# Patient Record
Sex: FEMALE | Race: WHITE
[De-identification: names, ages, dates, MRNs, and addresses within clinical notes are randomized per-mention and may not be internally consistent; named-entity substitution may affect disease eponyms.]

---

## 2019-03-03 ENCOUNTER — HOSPITAL ENCOUNTER (EMERGENCY)
Dept: HOSPITAL 96 - M.ERS | Age: 59
LOS: 1 days | Discharge: HOME | End: 2019-03-04
Payer: COMMERCIAL

## 2019-03-03 VITALS — HEIGHT: 62 IN | WEIGHT: 210.01 LBS | BODY MASS INDEX: 38.65 KG/M2

## 2019-03-03 DIAGNOSIS — I10: ICD-10-CM

## 2019-03-03 DIAGNOSIS — R55: Primary | ICD-10-CM

## 2019-03-03 DIAGNOSIS — E78.00: ICD-10-CM

## 2019-03-03 LAB
ABSOLUTE BASOPHILS: 0.1 THOU/UL (ref 0–0.2)
ABSOLUTE EOSINOPHILS: 0.2 THOU/UL (ref 0–0.7)
ABSOLUTE MONOCYTES: 0.6 THOU/UL (ref 0–1.2)
ALBUMIN SERPL-MCNC: 3.6 G/DL (ref 3.4–5)
ALP SERPL-CCNC: 64 U/L (ref 46–116)
ALT SERPL-CCNC: 18 U/L (ref 30–65)
ANION GAP SERPL CALC-SCNC: 6 MMOL/L (ref 7–16)
AST SERPL-CCNC: 16 U/L (ref 15–37)
BASOPHILS NFR BLD AUTO: 1.2 %
BILIRUB SERPL-MCNC: 0.5 MG/DL
BILIRUB UR-MCNC: NEGATIVE MG/DL
BUN SERPL-MCNC: 13 MG/DL (ref 7–18)
CALCIUM SERPL-MCNC: 8.6 MG/DL (ref 8.5–10.1)
CHLORIDE SERPL-SCNC: 102 MMOL/L (ref 98–107)
CO2 SERPL-SCNC: 31 MMOL/L (ref 21–32)
COLOR UR: YELLOW
CREAT SERPL-MCNC: 1.2 MG/DL (ref 0.6–1.3)
EOSINOPHIL NFR BLD: 2.3 %
GLUCOSE SERPL-MCNC: 143 MG/DL (ref 70–99)
GRANULOCYTES NFR BLD MANUAL: 68.7 %
HCT VFR BLD CALC: 35.6 % (ref 37–47)
HGB BLD-MCNC: 11.7 GM/DL (ref 12–15)
INR PPP: 1
KETONES UR STRIP-MCNC: NEGATIVE MG/DL
LIPASE: 117 U/L (ref 73–393)
LYMPHOCYTES # BLD: 1.3 THOU/UL (ref 0.8–5.3)
LYMPHOCYTES NFR BLD AUTO: 19.1 %
MCH RBC QN AUTO: 26.5 PG (ref 26–34)
MCHC RBC AUTO-ENTMCNC: 33 G/DL (ref 28–37)
MCV RBC: 80.4 FL (ref 80–100)
MONOCYTES NFR BLD: 8.7 %
MPV: 7.7 FL. (ref 7.2–11.1)
NEUTROPHILS # BLD: 4.6 THOU/UL (ref 1.6–8.1)
NT-PRO BRAIN NAT PEPTIDE: 52 PG/ML (ref ?–300)
NUCLEATED RBCS: 0 /100WBC
PLATELET COUNT*: 265 THOU/UL (ref 150–400)
POTASSIUM SERPL-SCNC: 3.4 MMOL/L (ref 3.5–5.1)
PROT SERPL-MCNC: 7.3 G/DL (ref 6.4–8.2)
PROT UR QL STRIP: NEGATIVE
PROTHROMBIN TIME: 10.6 SECONDS (ref 9.2–11.5)
RBC # BLD AUTO: 4.43 MIL/UL (ref 4.2–5)
RBC # UR STRIP: NEGATIVE /UL
RDW-CV: 14.7 % (ref 10.5–14.5)
SODIUM SERPL-SCNC: 139 MMOL/L (ref 136–145)
SP GR UR STRIP: 1.01 (ref 1–1.03)
TROPONIN-I LEVEL: <0.06 NG/ML (ref ?–0.06)
URINE CLARITY: CLEAR
URINE GLUCOSE-RANDOM: NEGATIVE
URINE LEUKOCYTES-REFLEX: NEGATIVE
URINE NITRITE-REFLEX: NEGATIVE
UROBILINOGEN UR STRIP-ACNC: 0.2 E.U./DL (ref 0.2–1)
WBC # BLD AUTO: 6.7 THOU/UL (ref 4–11)

## 2019-03-04 VITALS — SYSTOLIC BLOOD PRESSURE: 120 MMHG | DIASTOLIC BLOOD PRESSURE: 68 MMHG

## 2019-03-04 NOTE — EKG
[place] [address]
Phone:  [phone]                     ELECTROCARDIOGRAM REPORT      
_______________________________________________________________________________
 
Name:       OTIS ALONZO             Room:                      [facility]#:  [id number]      Account #:      [id number]  
Admission:  19     Attend Phys:                         
Discharge:  19     Date of Birth:  [birth year]  
         Report #: [id number]
    [id number]
_______________________________________________________________________________
THIS REPORT FOR:  //name//                      
 
                         [facility] ED
                                       
Test Date:    2019               Test Time:    20:58:35
Pat Name:     OTIS ALONZO           Department:   
Patient ID:   [id number]            Room:          
Gender:       F                        Technician:   
:          1960               Requested By: Malu Saavedra
Order Number: [id number] MD:   Familia Guerra
                                 Measurements
Intervals                              Axis          
Rate:         67                       P:            48
VA:           152                      QRS:          4
QRSD:         111                      T:            53
QT:           449                                    
QTc:          474                                    
                           Interpretive Statements
Sinus rhythm
Borderline T abnormalities, anterior leads
Baseline wander in lead(s) II,III,aVF,V4
No previous ECG available for comparison
 
Electronically Signed On 3-4-2019 16:06:58 CST by Familia Guerra
[email or web address]
 
 
 
 
 
 
 
 
 
 
 
 
 
 
 
 
 
 
  <ELECTRONICALLY SIGNED>
                                           By: Familia Guerra MD, [facility]     
  19     1606
D: 19   _____________________________________
T: 19   Familia Guerra MD, FACC       /EPI